# Patient Record
Sex: MALE
[De-identification: names, ages, dates, MRNs, and addresses within clinical notes are randomized per-mention and may not be internally consistent; named-entity substitution may affect disease eponyms.]

---

## 2017-08-21 ENCOUNTER — CHARTING TRANS (OUTPATIENT)
Dept: OTHER | Age: 47
End: 2017-08-21

## 2018-11-03 VITALS
BODY MASS INDEX: 27.8 KG/M2 | HEIGHT: 66 IN | WEIGHT: 173 LBS | HEART RATE: 96 BPM | OXYGEN SATURATION: 98 % | SYSTOLIC BLOOD PRESSURE: 134 MMHG | TEMPERATURE: 98.6 F | DIASTOLIC BLOOD PRESSURE: 74 MMHG | RESPIRATION RATE: 16 BRPM

## 2022-08-31 ENCOUNTER — APPOINTMENT (OUTPATIENT)
Dept: MRI IMAGING | Facility: HOSPITAL | Age: 52
End: 2022-08-31
Attending: EMERGENCY MEDICINE
Payer: COMMERCIAL

## 2022-08-31 ENCOUNTER — HOSPITAL ENCOUNTER (OUTPATIENT)
Facility: HOSPITAL | Age: 52
Setting detail: OBSERVATION
Discharge: HOME HEALTH | End: 2022-09-02
Attending: EMERGENCY MEDICINE | Admitting: HOSPITALIST
Payer: COMMERCIAL

## 2022-08-31 ENCOUNTER — HOSPITAL ENCOUNTER (INPATIENT)
Facility: HOSPITAL | Age: 52
LOS: 1 days | Discharge: HOME HEALTH | End: 2022-09-02
Attending: EMERGENCY MEDICINE | Admitting: HOSPITALIST
Payer: COMMERCIAL

## 2022-08-31 DIAGNOSIS — R42 DIZZINESS: Primary | ICD-10-CM

## 2022-08-31 DIAGNOSIS — I63.512 ARTERIAL ISCHEMIC STROKE, MCA (MIDDLE CEREBRAL ARTERY), LEFT, ACUTE (HCC): ICD-10-CM

## 2022-08-31 DIAGNOSIS — H53.2 DIPLOPIA: ICD-10-CM

## 2022-08-31 LAB
BASE EXCESS BLD CALC-SCNC: 8 MMOL/L (ref ?–2)
HCO3 BLDV-SCNC: 30 MEQ/L (ref 22–26)
PCO2 BLDV: 56 MM HG (ref 38–50)
PH BLDV: 7.4 [PH] (ref 7.32–7.43)
PO2 BLDV: 29 MM HG (ref 35–40)
PUNCTURE CHARGE: NO
SAO2 % BLDV: 50.6 % (ref 60–85)
SARS-COV-2 RNA RESP QL NAA+PROBE: NOT DETECTED

## 2022-08-31 PROCEDURE — 99220 INITIAL OBSERVATION CARE,LEVL III: CPT | Performed by: INTERNAL MEDICINE

## 2022-08-31 PROCEDURE — 70551 MRI BRAIN STEM W/O DYE: CPT | Performed by: EMERGENCY MEDICINE

## 2022-08-31 RX ORDER — ATORVASTATIN CALCIUM 40 MG/1
40 TABLET, FILM COATED ORAL NIGHTLY
Status: DISCONTINUED | OUTPATIENT
Start: 2022-09-01 | End: 2022-09-01

## 2022-08-31 RX ORDER — ACETAMINOPHEN 500 MG
500 TABLET ORAL EVERY 4 HOURS PRN
Status: DISCONTINUED | OUTPATIENT
Start: 2022-08-31 | End: 2022-09-02

## 2022-08-31 RX ORDER — MECLIZINE HYDROCHLORIDE 25 MG/1
25 TABLET ORAL ONCE
Status: COMPLETED | OUTPATIENT
Start: 2022-08-31 | End: 2022-08-31

## 2022-08-31 RX ORDER — ASPIRIN 81 MG/1
81 TABLET, CHEWABLE ORAL NIGHTLY
Status: DISCONTINUED | OUTPATIENT
Start: 2022-09-01 | End: 2022-09-01

## 2022-08-31 RX ORDER — MECLIZINE HYDROCHLORIDE 25 MG/1
25 TABLET ORAL EVERY 6 HOURS PRN
Status: DISCONTINUED | OUTPATIENT
Start: 2022-08-31 | End: 2022-09-02

## 2022-08-31 NOTE — ED QUICK NOTES
Patient comes from home with c/o dizziness and fatigue for past 3 days. 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 Patient denies any pain or SOB. Patient is A&Ox4. Mother at bedside.

## 2022-09-01 ENCOUNTER — APPOINTMENT (OUTPATIENT)
Dept: CT IMAGING | Facility: HOSPITAL | Age: 52
End: 2022-09-01
Attending: Other
Payer: COMMERCIAL

## 2022-09-01 LAB
ALBUMIN SERPL-MCNC: 3.5 G/DL (ref 3.4–5)
ALBUMIN/GLOB SERPL: 1 {RATIO} (ref 1–2)
ALP LIVER SERPL-CCNC: 69 U/L
ALT SERPL-CCNC: 26 U/L
ANION GAP SERPL CALC-SCNC: 3 MMOL/L (ref 0–18)
AST SERPL-CCNC: 18 U/L (ref 15–37)
BASE EXCESS BLD CALC-SCNC: 6 MMOL/L (ref ?–2)
BASOPHILS # BLD AUTO: 0.06 X10(3) UL (ref 0–0.2)
BASOPHILS NFR BLD AUTO: 1 %
BILIRUB SERPL-MCNC: 0.7 MG/DL (ref 0.1–2)
BILIRUB UR QL: NEGATIVE
BUN BLD-MCNC: 17 MG/DL (ref 7–18)
BUN/CREAT SERPL: 18.5 (ref 10–20)
CALCIUM BLD-MCNC: 9 MG/DL (ref 8.5–10.1)
CHLORIDE SERPL-SCNC: 106 MMOL/L (ref 98–112)
CHOLEST SERPL-MCNC: 135 MG/DL (ref ?–200)
CLARITY UR: CLEAR
CO2 SERPL-SCNC: 32 MMOL/L (ref 21–32)
COLOR UR: YELLOW
CREAT BLD-MCNC: 0.92 MG/DL
DEPRECATED RDW RBC AUTO: 39.9 FL (ref 35.1–46.3)
EOSINOPHIL # BLD AUTO: 0.31 X10(3) UL (ref 0–0.7)
EOSINOPHIL NFR BLD AUTO: 5.3 %
ERYTHROCYTE [DISTWIDTH] IN BLOOD BY AUTOMATED COUNT: 12.6 % (ref 11–15)
EST. AVERAGE GLUCOSE BLD GHB EST-MCNC: 117 MG/DL (ref 68–126)
GFR SERPLBLD BASED ON 1.73 SQ M-ARVRAT: 101 ML/MIN/1.73M2 (ref 60–?)
GLOBULIN PLAS-MCNC: 3.4 G/DL (ref 2.8–4.4)
GLUCOSE BLD-MCNC: 93 MG/DL (ref 70–99)
GLUCOSE UR-MCNC: NEGATIVE MG/DL
HBA1C MFR BLD: 5.7 % (ref ?–5.7)
HCO3 BLDA-SCNC: 29.5 MEQ/L (ref 21–27)
HCT VFR BLD AUTO: 42.3 %
HDLC SERPL-MCNC: 35 MG/DL (ref 40–59)
HGB BLD-MCNC: 13.7 G/DL
HGB UR QL STRIP.AUTO: NEGATIVE
IMM GRANULOCYTES # BLD AUTO: 0.02 X10(3) UL (ref 0–1)
IMM GRANULOCYTES NFR BLD: 0.3 %
KETONES UR-MCNC: NEGATIVE MG/DL
LDLC SERPL CALC-MCNC: 75 MG/DL (ref ?–100)
LEUKOCYTE ESTERASE UR QL STRIP.AUTO: NEGATIVE
LYMPHOCYTES # BLD AUTO: 1.67 X10(3) UL (ref 1–4)
LYMPHOCYTES NFR BLD AUTO: 28.7 %
MCH RBC QN AUTO: 28.2 PG (ref 26–34)
MCHC RBC AUTO-ENTMCNC: 32.4 G/DL (ref 31–37)
MCV RBC AUTO: 87 FL
MODIFIED ALLEN TEST: POSITIVE
MONOCYTES # BLD AUTO: 0.48 X10(3) UL (ref 0.1–1)
MONOCYTES NFR BLD AUTO: 8.3 %
NEUTROPHILS # BLD AUTO: 3.27 X10 (3) UL (ref 1.5–7.7)
NEUTROPHILS # BLD AUTO: 3.27 X10(3) UL (ref 1.5–7.7)
NEUTROPHILS NFR BLD AUTO: 56.4 %
NITRITE UR QL STRIP.AUTO: NEGATIVE
NONHDLC SERPL-MCNC: 100 MG/DL (ref ?–130)
O2 CT BLD-SCNC: 18.9 VOL% (ref 15–23)
OSMOLALITY SERPL CALC.SUM OF ELEC: 293 MOSM/KG (ref 275–295)
PCO2 BLDA: 43 MM HG (ref 35–45)
PH BLDA: 7.46 [PH] (ref 7.35–7.45)
PH UR: 7 [PH] (ref 5–8)
PLATELET # BLD AUTO: 190 10(3)UL (ref 150–450)
PO2 BLDA: 71 MM HG (ref 80–100)
POTASSIUM SERPL-SCNC: 3.6 MMOL/L (ref 3.5–5.1)
PROT SERPL-MCNC: 6.9 G/DL (ref 6.4–8.2)
PROT UR-MCNC: NEGATIVE MG/DL
PUNCTURE CHARGE: YES
RBC # BLD AUTO: 4.86 X10(6)UL
SAO2 % BLDA: 96.3 % (ref 94–100)
SODIUM SERPL-SCNC: 141 MMOL/L (ref 136–145)
SP GR UR STRIP: 1.02 (ref 1–1.03)
TRIGL SERPL-MCNC: 142 MG/DL (ref 30–149)
TSI SER-ACNC: 2.18 MIU/ML (ref 0.36–3.74)
UROBILINOGEN UR STRIP-ACNC: 0.2
VIT B12 SERPL-MCNC: 615 PG/ML (ref 193–986)
VLDLC SERPL CALC-MCNC: 22 MG/DL (ref 0–30)
WBC # BLD AUTO: 5.8 X10(3) UL (ref 4–11)

## 2022-09-01 PROCEDURE — 99223 1ST HOSP IP/OBS HIGH 75: CPT | Performed by: OTHER

## 2022-09-01 PROCEDURE — 70450 CT HEAD/BRAIN W/O DYE: CPT | Performed by: OTHER

## 2022-09-01 PROCEDURE — 99233 SBSQ HOSP IP/OBS HIGH 50: CPT | Performed by: HOSPITALIST

## 2022-09-01 RX ORDER — ASPIRIN 325 MG
325 TABLET ORAL DAILY
Status: DISCONTINUED | OUTPATIENT
Start: 2022-09-01 | End: 2022-09-02

## 2022-09-01 RX ORDER — LABETALOL HYDROCHLORIDE 5 MG/ML
10 INJECTION, SOLUTION INTRAVENOUS EVERY 10 MIN PRN
Status: DISCONTINUED | OUTPATIENT
Start: 2022-09-01 | End: 2022-09-02

## 2022-09-01 RX ORDER — ONDANSETRON 2 MG/ML
4 INJECTION INTRAMUSCULAR; INTRAVENOUS EVERY 6 HOURS PRN
Status: DISCONTINUED | OUTPATIENT
Start: 2022-09-01 | End: 2022-09-02

## 2022-09-01 RX ORDER — ASPIRIN 300 MG/1
300 SUPPOSITORY RECTAL DAILY
Status: DISCONTINUED | OUTPATIENT
Start: 2022-09-01 | End: 2022-09-02

## 2022-09-01 RX ORDER — HYDRALAZINE HYDROCHLORIDE 20 MG/ML
10 INJECTION INTRAMUSCULAR; INTRAVENOUS EVERY 2 HOUR PRN
Status: DISCONTINUED | OUTPATIENT
Start: 2022-09-01 | End: 2022-09-02

## 2022-09-01 RX ORDER — PROCHLORPERAZINE EDISYLATE 5 MG/ML
5 INJECTION INTRAMUSCULAR; INTRAVENOUS EVERY 8 HOURS PRN
Status: DISCONTINUED | OUTPATIENT
Start: 2022-09-01 | End: 2022-09-02

## 2022-09-01 RX ORDER — ACETAMINOPHEN 650 MG/1
650 SUPPOSITORY RECTAL EVERY 4 HOURS PRN
Status: DISCONTINUED | OUTPATIENT
Start: 2022-09-01 | End: 2022-09-02

## 2022-09-01 RX ORDER — ENOXAPARIN SODIUM 100 MG/ML
40 INJECTION SUBCUTANEOUS DAILY
Status: DISCONTINUED | OUTPATIENT
Start: 2022-09-02 | End: 2022-09-02

## 2022-09-01 RX ORDER — SODIUM CHLORIDE 9 MG/ML
INJECTION, SOLUTION INTRAVENOUS CONTINUOUS
Status: DISCONTINUED | OUTPATIENT
Start: 2022-09-01 | End: 2022-09-02

## 2022-09-01 RX ORDER — ATORVASTATIN CALCIUM 80 MG/1
80 TABLET, FILM COATED ORAL NIGHTLY
Status: DISCONTINUED | OUTPATIENT
Start: 2022-09-01 | End: 2022-09-02

## 2022-09-01 RX ORDER — ACETAMINOPHEN 325 MG/1
650 TABLET ORAL EVERY 4 HOURS PRN
Status: DISCONTINUED | OUTPATIENT
Start: 2022-09-01 | End: 2022-09-02

## 2022-09-01 NOTE — ED QUICK NOTES
Orders for admission, patient is aware of plan and ready to go upstairs. Any questions, please call ED RN Joya Cooks at extension [phone].      Patient Covid vaccination status: Fully vaccinated     COVID Test Ordered in ED: Rapid SARS-CoV-2 by PCR    COVID Suspicion at Admission: Low clinical suspicion for COVID    Running Infusions:  None    Mental Status/LOC at time of transport: A&O*4    Other pertinent information:   CIWA score: N/A   NIH score:  N/A

## 2022-09-01 NOTE — PLAN OF CARE
VSS, no pain. Head CT done today. Mother bedside. Patient ambulated in [place] today, some dizziness still. Problem: Patient Centered Care  Goal: Patient preferences are identified and integrated in the patient's plan of care  Description: Interventions:  - What would you like us to know as we care for you? Home alone  - Provide timely, complete, and accurate information to patient/family  - Incorporate patient and family knowledge, values, beliefs, and cultural backgrounds into the planning and delivery of care  - Encourage patient/family to participate in care and decision-making at the level they choose  - Honor patient and family perspectives and choices  Outcome: Progressing     Problem: Patient/Family Goals  Goal: Patient/Family Long Term Goal  Description: Patient's Long Term Goal:     Interventions:  -   - See additional Care Plan goals for specific interventions  Outcome: Progressing  Goal: Patient/Family Short Term Goal  Description: Patient's Short Term Goal:     Interventions:   -   - See additional Care Plan goals for specific interventions  Outcome: Progressing     Problem: SKIN/TISSUE INTEGRITY - ADULT  Goal: Skin integrity remains intact  Description: INTERVENTIONS  - Assess and document risk factors for pressure ulcer development  - Assess and document skin integrity  - Monitor for areas of redness and/or skin breakdown  - Initiate interventions, skin care algorithm/standards of care as needed  Outcome: Progressing     Problem: NEUROLOGICAL - ADULT  Goal: Achieves stable or improved neurological status  Description: INTERVENTIONS  - Assess for and report changes in neurological status  - Initiate measures to prevent increased intracranial pressure  - Maintain blood pressure and fluid volume within ordered parameters to optimize cerebral perfusion and minimize risk of hemorrhage  - Monitor temperature, glucose, and sodium.  Initiate appropriate interventions as ordered  Outcome: Progressing Problem: Impaired Functional Mobility  Goal: Achieve highest/safest level of mobility/gait  Description: Interventions:  - Assess patient's functional ability and stability  - Promote increasing activity/tolerance for mobility and gait  - Educate and engage patient/family in tolerated activity level and precautions    Outcome: Progressing     Problem: SAFETY ADULT - FALL  Goal: Free from fall injury  Description: INTERVENTIONS:  - Assess pt frequently for physical needs  - Identify cognitive and physical deficits and behaviors that affect risk of falls.   - [place] fall precautions as indicated by assessment.  - Educate pt/family on patient safety including physical limitations  - Instruct pt to call for assistance with activity based on assessment  - Modify environment to reduce risk of injury  - Provide assistive devices as appropriate  - Consider OT/PT consult to assist with strengthening/mobility  - Encourage toileting schedule  Outcome: Progressing

## 2022-09-01 NOTE — PLAN OF CARE
Problem: Patient Centered Care  Goal: Patient preferences are identified and integrated in the patient's plan of care  Description: Interventions:  - What would you like us to know as we care for you? \" I live by myself\". - Provide timely, complete, and accurate information to patient/family  - Incorporate patient and family knowledge, values, beliefs, and cultural backgrounds into the planning and delivery of care  - Encourage patient/family to participate in care and decision-making at the level they choose  - Honor patient and family perspectives and choices  Outcome: Progressing     Problem: Patient/Family Goals  Goal: Patient/Family Long Term Goal  Description: Patient's Long Term Goal: Go home    Interventions:  -Discharge planning  -Follow MD orders  -Monitor vitals, labs  -Assist w/ adl's  - See additional Care Plan goals for specific interventions  Outcome: Progressing  Goal: Patient/Family Short Term Goal  Description: Patient's Short Term Goal:  Go home  Interventions:   -Discharge planning  -Follow MD orders  -Monitor vitals, labs  -Assist w/ adl's    - See additional Care Plan goals for specific interventions  Outcome: Progressing     Problem: SKIN/TISSUE INTEGRITY - ADULT  Goal: Skin integrity remains intact  Description: INTERVENTIONS  - Assess and document risk factors for pressure ulcer development  - Assess and document skin integrity  - Monitor for areas of redness and/or skin breakdown  - Initiate interventions, skin care algorithm/standards of care as needed  Outcome: Progressing     Problem: NEUROLOGICAL - ADULT  Goal: Achieves stable or improved neurological status  Description: INTERVENTIONS  - Assess for and report changes in neurological status  - Initiate measures to prevent increased intracranial pressure  - Maintain blood pressure and fluid volume within ordered parameters to optimize cerebral perfusion and minimize risk of hemorrhage  - Monitor temperature, glucose, and sodium. Initiate appropriate interventions as ordered  Outcome: Progressing     Problem: Impaired Functional Mobility  Goal: Achieve highest/safest level of mobility/gait  Description: Interventions:  - Assess patient's functional ability and stability  - Promote increasing activity/tolerance for mobility and gait  - Educate and engage patient/family in tolerated activity level and precautions  Outcome: Progressing     Problem: SAFETY ADULT - FALL  Goal: Free from fall injury  Description: INTERVENTIONS:  - Assess pt frequently for physical needs  - Identify cognitive and physical deficits and behaviors that affect risk of falls. - [place] fall precautions as indicated by assessment.  - Educate pt/family on patient safety including physical limitations  - Instruct pt to call for assistance with activity based on assessment  - Modify environment to reduce risk of injury  - Provide assistive devices as appropriate  - Consider OT/PT consult to assist with strengthening/mobility  - Encourage toileting schedule  Outcome: Progressing   No acute changes, sleeping most of the night.

## 2022-09-01 NOTE — CM/SW NOTE
CM met w/pt and mother at bedside. Home w/mother, IADLs PTA. No hx w/HH   Hx stroke & brain tumor resection    Presents for unsteady gait. Pt verified address and stated he is employed. Pt does not use any assistive devices. PLAN; TBD    CM/SW to remain available for support and/or discharge planning.     Shoaib Roman RN, [facility]    Ext.  [phone]

## 2022-09-01 NOTE — PROGRESS NOTES
Neurology came by to assess patient. Believes patient had new stroke prior to admission. Stroke order-set ordered per neuro. RN notified TL and transfer order requested to transfer patient to CV floor. Neurology ok with transfer.

## 2022-09-02 ENCOUNTER — APPOINTMENT (OUTPATIENT)
Dept: CV DIAGNOSTICS | Facility: HOSPITAL | Age: 52
End: 2022-09-02
Attending: Other
Payer: COMMERCIAL

## 2022-09-02 ENCOUNTER — APPOINTMENT (OUTPATIENT)
Dept: GENERAL RADIOLOGY | Facility: HOSPITAL | Age: 52
End: 2022-09-02
Attending: Other
Payer: COMMERCIAL

## 2022-09-02 ENCOUNTER — APPOINTMENT (OUTPATIENT)
Dept: CT IMAGING | Facility: HOSPITAL | Age: 52
End: 2022-09-02
Attending: Other
Payer: COMMERCIAL

## 2022-09-02 VITALS
RESPIRATION RATE: 18 BRPM | OXYGEN SATURATION: 95 % | DIASTOLIC BLOOD PRESSURE: 82 MMHG | SYSTOLIC BLOOD PRESSURE: 148 MMHG | TEMPERATURE: 99 F | HEART RATE: 93 BPM | WEIGHT: 184.81 LBS | BODY MASS INDEX: 29.7 KG/M2 | HEIGHT: 66 IN

## 2022-09-02 LAB
GLUCOSE BLDC GLUCOMTR-MCNC: 108 MG/DL (ref 70–99)
GLUCOSE BLDC GLUCOMTR-MCNC: 123 MG/DL (ref 70–99)
GLUCOSE BLDC GLUCOMTR-MCNC: 82 MG/DL (ref 70–99)

## 2022-09-02 PROCEDURE — 93306 TTE W/DOPPLER COMPLETE: CPT | Performed by: OTHER

## 2022-09-02 PROCEDURE — 99221 1ST HOSP IP/OBS SF/LOW 40: CPT | Performed by: NEUROLOGICAL SURGERY

## 2022-09-02 PROCEDURE — 70250 X-RAY EXAM OF SKULL: CPT | Performed by: OTHER

## 2022-09-02 PROCEDURE — 74018 RADEX ABDOMEN 1 VIEW: CPT | Performed by: OTHER

## 2022-09-02 PROCEDURE — 71045 X-RAY EXAM CHEST 1 VIEW: CPT | Performed by: OTHER

## 2022-09-02 PROCEDURE — 70496 CT ANGIOGRAPHY HEAD: CPT | Performed by: OTHER

## 2022-09-02 PROCEDURE — 99233 SBSQ HOSP IP/OBS HIGH 50: CPT | Performed by: OTHER

## 2022-09-02 PROCEDURE — 70498 CT ANGIOGRAPHY NECK: CPT | Performed by: OTHER

## 2022-09-02 PROCEDURE — 70360 X-RAY EXAM OF NECK: CPT | Performed by: OTHER

## 2022-09-02 RX ORDER — ATORVASTATIN CALCIUM 80 MG/1
80 TABLET, FILM COATED ORAL NIGHTLY
Qty: 90 TABLET | Refills: 3 | Status: SHIPPED | OUTPATIENT
Start: 2022-09-02 | End: 2023-09-02

## 2022-09-02 NOTE — PLAN OF CARE
Patient alert and oriented. Neuros done Q4 and daily NIH. No complaints of visual problems. Cleared for discharge home with mother and home health. IV removed and tele discontinued. Instructions reviewed with patient and mother, questions answered and verbalized understanding. Transported home by mother. Problem: Patient Centered Care  Goal: Patient preferences are identified and integrated in the patient's plan of care  Description: Interventions:  - What would you like us to know as we care for you?  I have a shunt  - Provide timely, complete, and accurate information to patient/family  - Incorporate patient and family knowledge, values, beliefs, and cultural backgrounds into the planning and delivery of care  - Encourage patient/family to participate in care and decision-making at the level they choose  - Honor patient and family perspectives and choices  9/2/2022 1757 by Corry Barry RN  Outcome: Completed  9/2/2022 1057 by Corry Barry RN  Outcome: Progressing     Problem: Patient/Family Goals  Goal: Patient/Family Long Term Goal  Description: Patient's Long Term Goal: return home    Interventions:  - neuro checks  - physical therapy  - monitor labs and vitals  - MRI and CT  - See additional Care Plan goals for specific interventions  9/2/2022 1757 by Corry Barry RN  Outcome: Completed  9/2/2022 1057 by Corry Barry RN  Outcome: Progressing  Goal: Patient/Family Short Term Goal  Description: Patient's Short Term Goal: improve vision    Interventions:   - monitor labs and vitals  - MRI and CT  - neuro checks  - See additional Care Plan goals for specific interventions  9/2/2022 1757 by Corry Barry RN  Outcome: Completed  9/2/2022 1057 by Corry Barry RN  Outcome: Progressing     Problem: SKIN/TISSUE INTEGRITY - ADULT  Goal: Skin integrity remains intact  Description: INTERVENTIONS  - Assess and document risk factors for pressure ulcer development  - Assess and document skin integrity  - Monitor for areas of redness and/or skin breakdown  - Initiate interventions, skin care algorithm/standards of care as needed  9/2/2022 1757 by Jennie Pompa RN  Outcome: Completed  9/2/2022 1057 by Jennie Pompa RN  Outcome: Progressing     Problem: NEUROLOGICAL - ADULT  Goal: Achieves stable or improved neurological status  Description: INTERVENTIONS  - Assess for and report changes in neurological status  - Initiate measures to prevent increased intracranial pressure  - Maintain blood pressure and fluid volume within ordered parameters to optimize cerebral perfusion and minimize risk of hemorrhage  - Monitor temperature, glucose, and sodium.  Initiate appropriate interventions as ordered  9/2/2022 1757 by Jennie Pompa RN  Outcome: Completed  9/2/2022 1057 by Jennie Pompa RN  Outcome: Progressing  Goal: Achieves maximal functionality and self care  Description: INTERVENTIONS  - Monitor swallowing and airway patency with patient fatigue and changes in neurological status  - Encourage and assist patient to increase activity and self care with guidance from PT/OT  - Encourage visually impaired, hearing impaired and aphasic patients to use assistive/communication devices  9/2/2022 1757 by Jennie Pompa RN  Outcome: Completed  9/2/2022 1057 by Jennie Pompa RN  Outcome: Progressing     Problem: Impaired Functional Mobility  Goal: Achieve highest/safest level of mobility/gait  Description: Interventions:  - Assess patient's functional ability and stability  - Promote increasing activity/tolerance for mobility and gait  - Educate and engage patient/family in tolerated activity level and precautions  - Recommend use of chair position in bed 3 times per day  9/2/2022 1757 by Jennie Pompa RN  Outcome: Completed  9/2/2022 1057 by Jennie Pompa RN  Outcome: Progressing     Problem: SAFETY ADULT - FALL  Goal: Free from fall injury  Description: INTERVENTIONS:  - Assess pt frequently for physical needs  - Identify cognitive and physical deficits and behaviors that affect risk of falls.   - [place] fall precautions as indicated by assessment.  - Educate pt/family on patient safety including physical limitations  - Instruct pt to call for assistance with activity based on assessment  - Modify environment to reduce risk of injury  - Provide assistive devices as appropriate  - Consider OT/PT consult to assist with strengthening/mobility  - Encourage toileting schedule  9/2/2022 1757 by Neha Lai, RN  Outcome: Completed  9/2/2022 1057 by Neha Lai, RN  Outcome: Progressing

## 2022-09-02 NOTE — PLAN OF CARE
Patient alert x4, RA. No acute events or neuro changes throughout the night. Educated patient to call for assistance, call light within reach. Problem: Patient Centered Care  Goal: Patient preferences are identified and integrated in the patient's plan of care  Description: Interventions:  - What would you like us to know as we care for you?   - Provide timely, complete, and accurate information to patient/family  - Incorporate patient and family knowledge, values, beliefs, and cultural backgrounds into the planning and delivery of care  - Encourage patient/family to participate in care and decision-making at the level they choose  - Honor patient and family perspectives and choices  Outcome: Progressing     Problem: SKIN/TISSUE INTEGRITY - ADULT  Goal: Skin integrity remains intact  Description: INTERVENTIONS  - Assess and document risk factors for pressure ulcer development  - Assess and document skin integrity  - Monitor for areas of redness and/or skin breakdown  - Initiate interventions, skin care algorithm/standards of care as needed  Outcome: Progressing     Problem: NEUROLOGICAL - ADULT  Goal: Achieves stable or improved neurological status  Description: INTERVENTIONS  - Assess for and report changes in neurological status  - Initiate measures to prevent increased intracranial pressure  - Maintain blood pressure and fluid volume within ordered parameters to optimize cerebral perfusion and minimize risk of hemorrhage  - Monitor temperature, glucose, and sodium.  Initiate appropriate interventions as ordered  Outcome: Progressing  Goal: Achieves maximal functionality and self care  Description: INTERVENTIONS  - Monitor swallowing and airway patency with patient fatigue and changes in neurological status  - Encourage and assist patient to increase activity and self care with guidance from PT/OT  - Encourage visually impaired, hearing impaired and aphasic patients to use assistive/communication devices  Outcome: Progressing     Problem: Impaired Functional Mobility  Goal: Achieve highest/safest level of mobility/gait  Description: Interventions:  - Assess patient's functional ability and stability  - Promote increasing activity/tolerance for mobility and gait  - Educate and engage patient/family in tolerated activity level and precautions    Outcome: Progressing     Problem: SAFETY ADULT - FALL  Goal: Free from fall injury  Description: INTERVENTIONS:  - Assess pt frequently for physical needs  - Identify cognitive and physical deficits and behaviors that affect risk of falls.   - [place] fall precautions as indicated by assessment.  - Educate pt/family on patient safety including physical limitations  - Instruct pt to call for assistance with activity based on assessment  - Modify environment to reduce risk of injury  - Provide assistive devices as appropriate  - Consider OT/PT consult to assist with strengthening/mobility  - Encourage toileting schedule  Outcome: Progressing

## 2022-09-02 NOTE — CONSULTS
Asked to check shunt after mri on 8/31    Shunt checked, set at 2.0  spoke with patient and family- they don't know setting  Last note from care everywhere from [facility] neurosurgery states it is supposed to be set at 1.5    Valve reset to 1.5  Checked again    No further dilia follow up needed  F/u with surgeon at [facility]    Call with any ?'s/concerns

## 2022-09-02 NOTE — CM/SW NOTE
Pt is from home alone but will be staying with his mother who lives at [address]. [id number]. PT recommending HH w/PT if there is 24 hr supervision at dc, pt and mother agreeable to dc recommendation. [facility] referral sent w/correct dc address, F2F completed. List of accepting agencies will be needed for choice. 1330: Pt and mother provided list of accepting [facility] agencies at bedside. Pt agreeable to review and provide choice once able. Plan: Home w/mother and [facility] pending agency choice and medical clearance.     Reba Powers, BRISEYDAN    [phone]

## 2022-09-02 NOTE — CM/SW NOTE
09/02/22 1600   Discharge disposition   Expected discharge disposition Home-Health   Post Acute Care Provider   ([facility])   Discharge transportation Private car     Pt discussed during nursing rounds. Pt is stable for dc today. MD dc order entered. [facility] will provide RN and PT services at [place], agency notified of patient's dc home today. Pt's mother will provide transport at [place]. Plan: Home w/spouse and [facility] for [facility] services today. / to remain available for support and/or discharge planning.      BRISEYDA CruzN    [phone]

## 2022-09-02 NOTE — SLP NOTE
SPEECH DAILY NOTE - INPATIENT    ASSESSMENT & PLAN   ASSESSMENT    Proper PPE worn. Hands sanitized upon entrance/exit Pt room. Pt alert, afebrile and on room air. Pt seen for swallow analysis per BSE recommendations (after consulting with RN). Pt seen upright in bed with current diet of solid/thin liquids (BSE allowed straw use) for monitoring diet tolerance. Swallowing precautions/strategies discussed; mild cues needed for execution. Functional bite reflex/mastication/lingual skill on solids. No significant oral retention. Pharyngeal response appeared to trigger within 1-2 sec per hyolaryngeal elevation to completion (functional rise/strength per palpation). No overt clinical signs of aspiration on swallows of solid nor thin liquid via open cup (no coughing, no throat clearing, clear vocal quality and no SOB). Pt with aggressive coughing with thin liquids with straw use. Straws now prohibited. Collaborated with RN regarding Pt's swallowing plan of care. No report of difficulty taking meds. No CXR has been completed. Sp02 ~97% during this session. PLAN:    To f/u x1 session/meal to ensure safe tolerance of diet and reinforce swallowing/aspiration precautions. Will monitor for any CXR results. Family education as available. Diet Recommendations - Solids: Regular  Diet Recommendations - Liquids: Thin Liquids    Compensatory Strategies Recommended: No straws  Aspiration Precautions: Upright position; Slow rate;Small bites and sips  Medication Administration Recommendations: One pill at a time    Patient Experiencing Pain: No  Discharge Recommendations  Discharge Recommendations/Plan: Undetermined  Treatment Plan  Treatment Plan/Recommendations: Dysphagia therapy; Aspiration precautions   Interdisciplinary Communication: Discussed with RN    GOALS  Goal #1 The patient will tolerate Regular solid consistency and thin liquids without overt signs or symptoms of aspiration with 100 % accuracy over 1-2 session(s). No CSA on trials of solid nor thin liquids via open controlled cup for 100% of trials. Pt with aggressive coughing with thin liquids via straw. Now NO STRAW precaution added. .  In Progress   Goal #2 The patient will utilize compensatory strategies as outlined by  BSSE (clinical evaluation) including Slow rate, Small bites, Small sips, NO STRAW, Upright 90 degrees with min assistance 90 % of the time across 1-2 sessions. Pt upright in bed, self-fed oral trials. Swallowing precautions discussed/demonstrated. Mild cues for  controlled amounts. Now, NO STRAW added to swallowing precautions. Pt would benefit from additional reinforcement of aspiration precautions. Swallowing strategies written on white board in room.   In Progress   FOLLOW UP  Follow Up Needed (Documentation Required): Yes  SLP Follow-up Date: 09/03/22  Number of Visits to Meet Established Goals: 2    Session: 1    If you have any questions, please contact   IRIS Vale [facility]  #[phone]

## 2022-09-06 ENCOUNTER — TELEPHONE (OUTPATIENT)
Dept: NEUROLOGY | Facility: CLINIC | Age: 52
End: 2022-09-06

## 2022-09-06 NOTE — TELEPHONE ENCOUNTER
Dr. Jazmine Hernandez had recommended a DrHugo at [facility] but patient mother in unable to make an apt until December . Patient mother will  like to speak  to dr. Jazmine Hernandez to see if he can call them for a sooner apt .   Please advise

## 2022-09-07 DIAGNOSIS — I63.512 ARTERIAL ISCHEMIC STROKE, MCA (MIDDLE CEREBRAL ARTERY), LEFT, ACUTE (HCC): Primary | ICD-10-CM

## 2022-09-07 LAB — VITAMIN B1 (THIAMINE), WHOLE B: 126 NMOL/L

## 2022-09-07 NOTE — TELEPHONE ENCOUNTER
I can order outpatient physical therapy. I cannot get him an earlier appointment with Dr. Tomer Nguyen. I still think that he should see Dr. Tomer Nguyen for an opinion about his stroke.

## 2022-09-08 NOTE — TELEPHONE ENCOUNTER
Spoke to patient mother to notify of PT order. Scheduling phone number provided a time of call. She will call & schedule.     Patient mother was able to get an earlier appointment with Dr Thu Herzog for September 21st.

## 2022-09-09 ENCOUNTER — TELEPHONE (OUTPATIENT)
Dept: NEUROLOGY | Facility: CLINIC | Age: 52
End: 2022-09-09

## 2022-09-09 NOTE — TELEPHONE ENCOUNTER
Received [facility] paperwork for the patient. The patient was evaluated in the ER on 9/2/22 by VS.  The patient has a pending appointment on 9/23/22. Dr. Fuad Mitchell, would you like for us to complete the [facility] paperwork based on the ER progress notes, or can a letter be written excusing the patient from work until he is seen in the office on 9/23/22? Please advise. Paperwork is currently in the nurse bin. Reviewed and electronically signed by:  [address], [facility]

## 2022-09-09 NOTE — TELEPHONE ENCOUNTER
I will send him a [email or web address] message with a work excuse letter until 9/23/2022. We can do the [facility] paperwork afterwards.

## 2022-09-12 ENCOUNTER — TELEPHONE (OUTPATIENT)
Dept: PHYSICAL THERAPY | Facility: HOSPITAL | Age: 52
End: 2022-09-12

## 2022-09-13 ENCOUNTER — OFFICE VISIT (OUTPATIENT)
Dept: PHYSICAL THERAPY | Facility: HOSPITAL | Age: 52
End: 2022-09-13
Attending: Other
Payer: COMMERCIAL

## 2022-09-13 DIAGNOSIS — I63.512 ARTERIAL ISCHEMIC STROKE, MCA (MIDDLE CEREBRAL ARTERY), LEFT, ACUTE (HCC): ICD-10-CM

## 2022-09-13 PROCEDURE — 97161 PT EVAL LOW COMPLEX 20 MIN: CPT

## 2022-09-19 ENCOUNTER — OFFICE VISIT (OUTPATIENT)
Dept: PHYSICAL THERAPY | Facility: HOSPITAL | Age: 52
End: 2022-09-19
Attending: Other
Payer: COMMERCIAL

## 2022-09-19 PROCEDURE — 97110 THERAPEUTIC EXERCISES: CPT

## 2022-09-19 PROCEDURE — 97112 NEUROMUSCULAR REEDUCATION: CPT

## 2022-09-19 NOTE — PATIENT INSTRUCTIONS
Do these exercises 1-2 times each day. 1. Stand tall at the countertop and hold on lightly with one hand. Lift one leg forward, out to the side and backward, keeping leg straight, and balancing. Not too big of a lift. Do 10 times in each direction, then repeat on the other leg. 2. Stand next to bed or countertop for safety. Hold a thumbs up at arms length in front of your eyes. Keep eyes focused on target. Turn eyes, head and target together left and right. Do 10 turns, then repeat in up and down direction. 3. Walk and turn your head side to side to look at the walls as if window shopping. Turn head every 4-5 steps and try to maintain a straight path. Do length of walkway in home, twice. Repeat with up and down head turns. 4. Stand with your bed behind you for safety (do not lean against bed). Stand tall and place feet next to each other so that they are touching. Cross arms, close your eyes. Balance for 30 seconds, paying attention to the feeling of your feet on the floor. 5. Stand next to bed or countertop for safety. Take a step forward with one foot and cross your arms. Balance with eyes open for 30 seconds. Practice with each foot in front. 6. Stand up tall facing one wall. Side step 15 times to the right then 15 times to the left. Face the same direction the whole time.

## 2022-09-19 NOTE — PROGRESS NOTES
Diagnosis: imbalance, CVA  Visit (# authorized):  10 per POC Sharashawn Keyon)                         Referring Physician: Aurelio Larsen    Precautions: at risk of falls, [place]    Medication changes since last visit?:  Yes: statin dose lowered by half    Subjective: No new information to present     Objective:    Date  9/19          Visit Number  2          NMR x          Ther EX x          Ther Act           Gait Training           CRM            Manual             Additional Treatment Information:    Therapeutic Exercise (18 mins):  Nu-step level 4x10 mins   3 kicks 1 UE support x10 B -demo with return demo, VC's for control   Side stepping x15 B     NMR (20 mins):   VOR cxl x10 ea horizontal and vertical-demo with return demo   Ambulation with head turns horizontal and vertical 50 ft x2 ea   Romberg EC 30 seconds (postural sway WNL)  Step stance EO 30 seconds ea foot in front   4 square step test: 16.8 seconds- close supervision      At risk of falls: YES   4 square stepping x3 ea CW and CCW -close supervision  Foam romberg EO 30 seconds (increased postural sway)   Pt selected SAM on foam, SBA   Upper trunk rotation x10 B   EC x30 sec (increased postural sway)      Patient Education:  HEP initiated and issued- see pt instructions     Assessment:   Pt mildly impulsive in movement but responds well to VC's for safety. Demo's understanding of material taught today. Is considered an increased risk of falls based on 4 square step test.     Plan: in future sessions: MMT LE, tap ups rebounder, backwards walking, speed ladder.      Charges: 2 NMR, 1 TE       Total Timed Treatment: 38 min  Total Treatment Time: 39 min

## 2022-09-22 ENCOUNTER — OFFICE VISIT (OUTPATIENT)
Dept: PHYSICAL THERAPY | Facility: HOSPITAL | Age: 52
End: 2022-09-22
Attending: Other

## 2022-09-22 PROCEDURE — 97112 NEUROMUSCULAR REEDUCATION: CPT

## 2022-09-22 NOTE — PROGRESS NOTES
Diagnosis: imbalance, CVA  Visit (# authorized):  10 per POC Christiane Benavides)                         Referring Physician: Sabrina Little    Precautions: at risk of falls, [place]    Medication changes since last visit?:  No    Subjective: No new information to present     Objective:    Date  9/19 9/22         Visit Number  2 3         NMR x x         Ther EX x          Ther Act           Gait Training           CRM            Manual             Additional Treatment Information:    NMR (39 mins):   Ambulation indoor, busier environment engaged in conversation, visually scanning -15 mins   Rebounder 4#   Pt selected SAM x15   Romberg x15- SBA    Fwd step throw<>catch x15 B SBA with CGA for balance checks   Standing unsupported: 4#    PNF lifts x10 B -demo with return demo   B shoulder flex OH x15 -head and eyes follow ball   Speed ladder    2 feet per box-multiple bouts - with and without lateral wedge L   1 foot per box 4 bouts -lateral wedge L     Patient Education:  Encouraged continued performance of HEP. Pt educated on rationale for lateral wedge L. Assessment:   Pt with supination of L foot at initial contact through midstance resulting in postural instability. This was greatly improved with addition of lateral wedge. Postural stability with reduction in path deviation noted with increased distance/time walking.      Plan: in future sessions: MMT LE, tap ups, backwards walking    Charges: 3 NMR       Total Timed Treatment: 39 min  Total Treatment Time: 39 min

## 2022-09-23 ENCOUNTER — OFFICE VISIT (OUTPATIENT)
Dept: NEUROLOGY | Facility: CLINIC | Age: 52
End: 2022-09-23

## 2022-09-23 VITALS
BODY MASS INDEX: 29.57 KG/M2 | HEIGHT: 66 IN | WEIGHT: 184 LBS | DIASTOLIC BLOOD PRESSURE: 68 MMHG | SYSTOLIC BLOOD PRESSURE: 122 MMHG | HEART RATE: 92 BPM

## 2022-09-23 DIAGNOSIS — I63.512 ARTERIAL ISCHEMIC STROKE, MCA (MIDDLE CEREBRAL ARTERY), LEFT, ACUTE (HCC): Primary | ICD-10-CM

## 2022-09-23 PROCEDURE — 3078F DIAST BP <80 MM HG: CPT | Performed by: OTHER

## 2022-09-23 PROCEDURE — 3074F SYST BP LT 130 MM HG: CPT | Performed by: OTHER

## 2022-09-23 PROCEDURE — 99214 OFFICE O/P EST MOD 30 MIN: CPT | Performed by: OTHER

## 2022-09-23 PROCEDURE — 3008F BODY MASS INDEX DOCD: CPT | Performed by: OTHER

## 2022-09-23 RX ORDER — CLOPIDOGREL BISULFATE 75 MG/1
75 TABLET ORAL DAILY
COMMUNITY
Start: 2022-09-21

## 2022-09-26 ENCOUNTER — TELEPHONE (OUTPATIENT)
Dept: NEUROLOGY | Facility: CLINIC | Age: 52
End: 2022-09-26

## 2022-09-26 ENCOUNTER — OFFICE VISIT (OUTPATIENT)
Dept: PHYSICAL THERAPY | Facility: HOSPITAL | Age: 52
End: 2022-09-26
Attending: Other

## 2022-09-26 PROCEDURE — 97112 NEUROMUSCULAR REEDUCATION: CPT

## 2022-09-26 NOTE — TELEPHONE ENCOUNTER
FMLA forms from [employer] filled out. Patient was admitted on 9/1 and per Dr. Carmelita Thornton okay to return to work on 10/3 with no restriction. Forms filled out accordingly. Mom states that forms need to be faxed by 9/27 (tomorrow). Faxed forms to OP. Will need Dr. Carmelita Thornton to sign before faxing to [facility] source at [phone].

## 2022-09-26 NOTE — PROGRESS NOTES
Diagnosis: imbalance, CVA  Visit (# authorized):  10 per POC Joana Deng)                         Referring Physician: Corazon Ritchie    Precautions: at risk of falls, [place]    Medication changes since last visit?:  No    Subjective: Plans to return to work a week from today. Objective:    Date  9/19 9/22 9/26        Visit Number  2 3 4        NMR x x x        Ther EX x          Ther Act           Gait Training           CRM            Manual             Additional Treatment Information:    NMR (41  mins):   Ambulation indoor, busier environment engaged in conversation, visually scanning (scavenger hunt) and navigation task -25 mins   Dual manual task    Ball on cup 1 lg lap   Water filled up ~120 ft (difficulty performing without spilling)  Weaving around cones (8)   2 bouts    4 bouts with dual manual task (ball on cup)   Side stepping over discs (8) x2 ea direction- SBA, CGA for balance checks   Dual motor task: sit<>stand with elbow flex<>ext (5#) x10- demo with return demo      Patient Education:  Encouraged continued performance of HEP. Assessment:   Challenged by dual manual tasking. Balance appears to be improving.       Plan: in future sessions: MMT LE, tap ups, backwards walking    Charges: 3 NMR       Total Timed Treatment: 41 min  Total Treatment Time: 41 min

## 2022-09-27 NOTE — TELEPHONE ENCOUNTER
Forms received in OP location. Signed & dated by Dr Josey Mueller on 9/27/22. Faxed to [facility] source @ [phone]. Confirmation received.      [facility] form sent to scanning in OP location

## 2022-09-28 ENCOUNTER — OFFICE VISIT (OUTPATIENT)
Dept: PHYSICAL THERAPY | Facility: HOSPITAL | Age: 52
End: 2022-09-28
Attending: Other

## 2022-09-28 PROCEDURE — 97112 NEUROMUSCULAR REEDUCATION: CPT

## 2022-09-28 NOTE — PROGRESS NOTES
Diagnosis: imbalance, CVA  Visit (# authorized):  10 per POC Dasha Rodríguez)                         Referring Physician: Lupe Mann    Precautions: at risk of falls, [place]    Medication changes since last visit?:  No    Subjective: No new information to report. Objective:    Date  9/19 9/22 9/26 9/28       Visit Number  2 3 4 5       NMR x x x x       Ther EX x          Ther Act           Gait Training           CRM            Manual             Additional Treatment Information:    NMR (42 mins):   Ambulation indoor, busier environment engaged in conversation, visually scanning (scavenger hunt) and navigation task -20 mins   Dual manual task    Water filled up ~120 ft (difficulty performing without spilling)   Ball on [employer]t 2 lg laps   Turns with visual scanning task alt directions (Checkerboard 1-30)   Ambulation backwards 30 ft -SBA VC's to \"Slow down\"   Ambulation fwd<>backwards with turns on command 30 ft x3 -SBA with CGA for balance checks with postural instability after turn    Patient Education:  Information for lateral wedge purchase provided. Assessment:   Challenged by dual manual tasking and turns during ambulation. Pt reports feeling \"Dizzy\" with turns during ambulation but upon further questioning sensation is more feeling of imbalance vs dizziness.      Plan: in future sessions: MMT LE, tap ups, continue backwards walking    Charges: 3 NMR       Total Timed Treatment: 42 min  Total Treatment Time: 42 min

## 2022-10-03 ENCOUNTER — APPOINTMENT (OUTPATIENT)
Dept: PHYSICAL THERAPY | Facility: HOSPITAL | Age: 52
End: 2022-10-03
Attending: Other
Payer: COMMERCIAL

## 2022-10-04 ENCOUNTER — OFFICE VISIT (OUTPATIENT)
Dept: PHYSICAL THERAPY | Facility: HOSPITAL | Age: 52
End: 2022-10-04
Attending: Other
Payer: COMMERCIAL

## 2022-10-04 PROCEDURE — 97112 NEUROMUSCULAR REEDUCATION: CPT

## 2022-10-04 NOTE — PROGRESS NOTES
Diagnosis: imbalance, CVA  Visit (# authorized):  10 per POC Zachary Barahona)                         Referring Physician: Josey Mueller    Precautions: at risk of falls, [place]    Medication changes since last visit?:  No    Subjective: No new information to report. Objective:    Date  9/19 9/22 9/26 9/28 10/4      Visit Number  2 3 4 5 6      NMR x x x x x      Ther EX x          Ther Act           Gait Training           CRM            Manual             Additional Treatment Information:    NMR (38 mins):   Ambulation outdoor and indoor, busier environment engaged in conversation, visually scanning. Including uneven surfaces and inclines/declines -25 mins (1 seated rest break during bout)  Ambulation stepping over raised obstacles (7) -SBA 6 bouts (1 LOB-[place] to recover)  Side stepping including over sticks (4) 2 bouts ea direction- supervision   Tap ups 8 in step x20 B- SBA-CGA     Assessment:   Path deviation with ambulation outdoors when engaged in conversation particularly. Challenged by stance stability on LLE.      Plan: in future sessions: MMT LE, continue tap ups, continue backwards walking    Charges: 3 NMR       Total Timed Treatment: 38 min  Total Treatment Time: 40 min

## 2022-10-05 ENCOUNTER — APPOINTMENT (OUTPATIENT)
Dept: PHYSICAL THERAPY | Facility: HOSPITAL | Age: 52
End: 2022-10-05
Attending: Other
Payer: COMMERCIAL

## 2022-10-10 ENCOUNTER — APPOINTMENT (OUTPATIENT)
Dept: PHYSICAL THERAPY | Facility: HOSPITAL | Age: 52
End: 2022-10-10
Attending: Other
Payer: COMMERCIAL

## 2022-10-11 ENCOUNTER — OFFICE VISIT (OUTPATIENT)
Dept: PHYSICAL THERAPY | Facility: HOSPITAL | Age: 52
End: 2022-10-11
Attending: Other
Payer: COMMERCIAL

## 2022-10-11 PROCEDURE — 97112 NEUROMUSCULAR REEDUCATION: CPT

## 2022-10-11 NOTE — PROGRESS NOTES
Diagnosis: imbalance, CVA  Visit (# authorized):  10 per POC Zachary Barahona)                         Referring Physician: Josey Mueller    Precautions: at risk of falls, [place]    Medication changes since last visit?:  No    Subjective: pt reports non-compliance to provided HEP. Objective:  Pt arrived late to session. Date  9/19 9/22 9/26 9/28 10/4 10/11     Visit Number  2 3 4 5 6 7     NMR x x x x x x     Ther EX x          Ther Act           Gait Training           CRM            Manual             Additional Treatment Information:    NMR (38 mins):   Ambulation with head turns- horizontal and vertical 60 ft x2 ea   VOR cxl with ambulation horizontal and vertical 60 ft x4 ea   Backwards walking 50 ft x4   Side stepping 40 ft x2 ea direction   Cross over stepping 40 ft x2 ea direction   4 steps EC, stop EO- repeat 50 ft x4   Rebounder 2# - fwd step with throw x15 B   Sit<>Stand feet on foam- no UE support, standing for ~5 seconds before returning to sitting x15  Standing on foam- pt selected SAM- upper trunk rotation x10 B- SBA     Patient education: Pt encouraged to increase compliance to HEP. Assessment:   Pt initially challenged activities standing on foam but improved stability with increased time/reps.      Plan: in future sessions: braiding, continue tap ups, continue EC walking, continue foam     Charges: 3 NMR       Total Timed Treatment: 38 min  Total Treatment Time: 38 min

## 2022-10-12 ENCOUNTER — APPOINTMENT (OUTPATIENT)
Dept: PHYSICAL THERAPY | Facility: HOSPITAL | Age: 52
End: 2022-10-12
Attending: Other
Payer: COMMERCIAL

## 2022-10-13 ENCOUNTER — OFFICE VISIT (OUTPATIENT)
Dept: PHYSICAL THERAPY | Facility: HOSPITAL | Age: 52
End: 2022-10-13
Attending: Other
Payer: COMMERCIAL

## 2022-10-13 PROCEDURE — 97112 NEUROMUSCULAR REEDUCATION: CPT

## 2022-10-13 NOTE — PROGRESS NOTES
Diagnosis: imbalance, CVA  Visit (# authorized):  10 per POC Gopalshawn Miller)                         Referring Physician: Bowen Hearn    Precautions: at risk of falls, [place]    Medication changes since last visit?:  No    Subjective: no new information to report. Objective:     Date  9/19 9/22 9/26 9/28 10/4 10/11 10/13    Visit Number  2 3 4 5 6 7 8    NMR x x x x x x x    Ther EX x          Ther Act           Gait Training           CRM            Manual             Additional Treatment Information:    NMR (38 mins): Step up 4 in step no UE support x15 B -SBA  Side step up 4 in step no UE support x15 B -SBA  VOR cxl with ambulation horizontal and vertical 60 ft x4 ea   Cross over stepping 40 ft ea direction   Braiding 40 ft ea direction- SBA with CGA for balance checks   4 steps EC, stop EO- repeat 50 ft x4 -->8 steps EC 50 ft x4 SBA  Sit<>Stand feet on foam- no UE support, standing for ~5 seconds before returning to sitting x15  Standing on foam- pt selected SAM- upper trunk rotation x10 B- SBA  Rebounder 2# -SBA   Pt selected SAM x20    Narrow SAM x20  Tap up cone x10 B -SBA    Assessment:   Progressing well. Intermittent seated rest breaks during session.      Plan: in future sessions: braiding, continue tap ups, continue EC walking, continue foam     Charges: 3 NMR       Total Timed Treatment: 40 min  Total Treatment Time: 42 min

## 2022-10-17 ENCOUNTER — APPOINTMENT (OUTPATIENT)
Dept: PHYSICAL THERAPY | Facility: HOSPITAL | Age: 52
End: 2022-10-17
Attending: Other
Payer: COMMERCIAL

## 2022-10-18 ENCOUNTER — OFFICE VISIT (OUTPATIENT)
Dept: PHYSICAL THERAPY | Facility: HOSPITAL | Age: 52
End: 2022-10-18
Attending: Other
Payer: COMMERCIAL

## 2022-10-18 PROCEDURE — 97112 NEUROMUSCULAR REEDUCATION: CPT

## 2022-10-18 NOTE — PROGRESS NOTES
Diagnosis: imbalance, CVA  Visit (# authorized):  10 per POC Viki Montaño)                         Referring Physician: Flavio Ivy    Precautions: at risk of falls, [place]    Medication changes since last visit?:  No    Subjective: no new information to report. Objective:     Date  9/19 9/22 9/26 9/28 10/4 10/11 10/13 10/18   Visit Number  2 3 4 5 6 7 8 9   NMR x x x x x x x x   Ther EX x          Ther Act           Gait Training           CRM            Manual             Additional Treatment Information:    NMR (40 mins):   Ambulation through indoor environment visually scanning for specific items as well as navigation task- 20 mins   Braiding- demo with return demo- 40 ft ea direction, cues for sequence SBA with CGA for balance checks  One foot on foam- opp foot to cone tap x15 B- SBA with CGA for balance checks   Step stance on foam 30 sec ea foot in front  Step stance on foam with body blade held out in front in BUE - dual task with self perturbations 30 sec ea foot in front   Rockerboard A/P taps-->centered hold for max time   Rockerboard R/L centered hold for max time      Assessment:   Continues to progress well.  Challenged by foam tasks today    Plan: in future sessions: continue tap ups, continue EC walking, continue foam, fwd step to foam from foam    Charges: 3 NMR       Total Timed Treatment: 40 min  Total Treatment Time: 42 min

## 2022-10-19 ENCOUNTER — APPOINTMENT (OUTPATIENT)
Dept: PHYSICAL THERAPY | Facility: HOSPITAL | Age: 52
End: 2022-10-19
Attending: Other
Payer: COMMERCIAL

## 2022-10-20 ENCOUNTER — OFFICE VISIT (OUTPATIENT)
Dept: PHYSICAL THERAPY | Facility: HOSPITAL | Age: 52
End: 2022-10-20
Attending: Other
Payer: COMMERCIAL

## 2022-10-20 PROCEDURE — 97112 NEUROMUSCULAR REEDUCATION: CPT

## 2022-10-20 NOTE — PROGRESS NOTES
Discharge Summary    Referring Physician: Josey Mueller    Diagnosis: imbalance, CVA     Pt has attended 10 visits in Physical Therapy. Subjective: Pt reports increased compliance to HEP. Feels as if he is getting stronger and balance has improved. Denies falls and LOB. Feels mildly off balance with quick turns otherwise feels \"ok\". Assessment: Pt demonstrates significant improvement in DGI and SSWS compared to evaluation. He is not considered an increased risk of falling based on DGI, FGA, or 4 square step test. He is able to navigate more complex environments with good postural stability and no AD despite intermittent path deviation especially with distraction/dual tasking. All goals have been met and he is to be discharged at this time. Objective:   *values from initial testing documented in parenthesis below    SLS EO: R 2 seconds (3 sec), L 7 seconds (3 sec)     Gait speed: 1.15 m/s (0.81 m/s no AD)          Dynamic Gait Index    1. Gait level surface __3___  2. Change in gait speed __3___  3. Gait with horizontal head turns __2___  4. Gait with vertical head turns __3___  5. Gait and pivot turn __3___  6. Step over obstacle __3__  7. Step around obstacles _3__  8. Steps __2___    Total  _22 (17)___/24  </= 19/24 = predictive of falls     Functional Gait Assessment   Item Description Score (0 worst, 3 best)    ___3___1. Gait Level Surface-  Time: ___5.3____seconds  ___3___2. Change in gait speed  ___2___3. Gait with horizontal head turns (mild path deviation)  ___3___4. Gait with vertical head turns  ___3___5. Gait and pivot turn  ___3___6. Step over obstacle  ___0___7. Gait with narrow base of support-  # of steps___2____  ___0___8. Gait with eyes closed-  Time: unable, path deviation  ___3___9. Ambulating backward  ___2___10.  Steps    TOTAL SCORE: ___22___/30    (less than 22/30 = fall risk)    4 square step test: 10.5 seconds (16.8 seconds- close supervision)       Goals:   Goals to be met within POC or 90 days:  1. Pt to be independent in HEP goal met   2. Pt will ambulate complex community environments indoors and outdoors without LOB independently without AD goal met  3. Pt will be able to perform functional head turns/visually scanning during ambulation without LOB. Goal met       Charges: 2 NMR     Total timed treatment: 30 mins  Total treatment time: 30 mins      Plan: DC PT    Patient was advised of these findings, precautions, and treatment options and has agreed to actively participate in planning and for this course of care. Thank you for your referral. If you have any questions, please contact me at Dept: [phone].     Sincerely,    Birdena Apgar PT, NCS    Electronically signed by therapist: Birdena Apgar, PT

## 2022-10-24 ENCOUNTER — APPOINTMENT (OUTPATIENT)
Dept: PHYSICAL THERAPY | Facility: HOSPITAL | Age: 52
End: 2022-10-24
Attending: Other
Payer: COMMERCIAL

## 2022-10-25 ENCOUNTER — APPOINTMENT (OUTPATIENT)
Dept: PHYSICAL THERAPY | Facility: HOSPITAL | Age: 52
End: 2022-10-25
Attending: Other
Payer: COMMERCIAL

## 2022-10-26 ENCOUNTER — APPOINTMENT (OUTPATIENT)
Dept: PHYSICAL THERAPY | Facility: HOSPITAL | Age: 52
End: 2022-10-26
Attending: Other
Payer: COMMERCIAL

## 2022-10-27 ENCOUNTER — APPOINTMENT (OUTPATIENT)
Dept: PHYSICAL THERAPY | Facility: HOSPITAL | Age: 52
End: 2022-10-27
Attending: Other
Payer: COMMERCIAL

## 2022-11-01 ENCOUNTER — APPOINTMENT (OUTPATIENT)
Dept: PHYSICAL THERAPY | Facility: HOSPITAL | Age: 52
End: 2022-11-01
Attending: Other
Payer: COMMERCIAL

## 2022-11-03 ENCOUNTER — APPOINTMENT (OUTPATIENT)
Dept: PHYSICAL THERAPY | Facility: HOSPITAL | Age: 52
End: 2022-11-03
Attending: Other
Payer: COMMERCIAL

## 2022-11-08 ENCOUNTER — APPOINTMENT (OUTPATIENT)
Dept: PHYSICAL THERAPY | Facility: HOSPITAL | Age: 52
End: 2022-11-08
Attending: Other
Payer: COMMERCIAL

## 2022-11-10 ENCOUNTER — APPOINTMENT (OUTPATIENT)
Dept: PHYSICAL THERAPY | Facility: HOSPITAL | Age: 52
End: 2022-11-10
Attending: Other
Payer: COMMERCIAL

## 2025-02-04 ENCOUNTER — OFFICE VISIT (OUTPATIENT)
Dept: FAMILY MEDICINE CLINIC | Facility: CLINIC | Age: 55
End: 2025-02-04
Payer: COMMERCIAL

## 2025-02-04 VITALS
HEART RATE: 102 BPM | BODY MASS INDEX: 31 KG/M2 | SYSTOLIC BLOOD PRESSURE: 136 MMHG | RESPIRATION RATE: 18 BRPM | WEIGHT: 190 LBS | DIASTOLIC BLOOD PRESSURE: 60 MMHG | TEMPERATURE: 100 F | OXYGEN SATURATION: 98 %

## 2025-02-04 DIAGNOSIS — J11.1 INFLUENZA-LIKE ILLNESS: Primary | ICD-10-CM

## 2025-02-04 LAB
OPERATOR ID: NORMAL
RAPID SARS-COV-2 BY PCR: NOT DETECTED

## 2025-02-04 PROCEDURE — U0002 COVID-19 LAB TEST NON-CDC: HCPCS | Performed by: NURSE PRACTITIONER

## 2025-02-04 PROCEDURE — 99202 OFFICE O/P NEW SF 15 MIN: CPT | Performed by: NURSE PRACTITIONER

## 2025-02-04 RX ORDER — OSELTAMIVIR PHOSPHATE 75 MG/1
75 CAPSULE ORAL 2 TIMES DAILY
Qty: 10 CAPSULE | Refills: 0 | Status: SHIPPED | OUTPATIENT
Start: 2025-02-04 | End: 2025-02-09

## 2025-02-04 RX ORDER — DILTIAZEM HYDROCHLORIDE 360 MG/1
360 CAPSULE, EXTENDED RELEASE ORAL DAILY
COMMUNITY
Start: 2024-09-25

## 2025-02-04 RX ORDER — LOSARTAN POTASSIUM 50 MG/1
100 TABLET ORAL DAILY
COMMUNITY
Start: 2024-12-09

## 2025-02-04 RX ORDER — ATORVASTATIN CALCIUM 80 MG/1
1 TABLET, FILM COATED ORAL DAILY
COMMUNITY
Start: 2025-01-31

## 2025-02-04 NOTE — PATIENT INSTRUCTIONS
Start Tamiflu twice daily with food, may make you nauseated/vomit-so eat something when taking!  Rest as able  Drink at least 4 oz of water every hour during the day, minimum water intake of 80 oz  May take Tylenol as needed for pain/fever  Follow up with primary care if symptoms persist longer than one week. Go directly to the emergency room if symptoms worsen (unable to drink fluids as above, dizziness worsens, any other concerning symptoms)

## 2025-02-04 NOTE — PROGRESS NOTES
HPI:   Garfield Gomez is a 55 year old male who presents with ill symptoms for  3  days. Patient reports extreme fatigue that began three days ago, loss of appetite, nasal congestion, low grade fever. Has tried nothing for relief relief. Unsure of contacts sick. Mother is with him and states he has slept for multiple hours, came home from work today and went to bed, not drinking well.    Current Outpatient Medications   Medication Sig Dispense Refill    atorvastatin 80 MG Oral Tab Take 1 tablet (80 mg total) by mouth daily.      dilTIAZem HCl ER Coated Beads 360 MG Oral Capsule SR 24 Hr Take 1 capsule (360 mg total) by mouth daily.      losartan 50 MG Oral Tab Take 2 tablets (100 mg total) by mouth daily.      clopidogrel 75 MG Oral Tab Take 1 tablet (75 mg total) by mouth daily.      aspirin 81 MG Oral Chew Tab Chew 1 tablet (81 mg total) by mouth at bedtime.       No current facility-administered medications for this visit.      Past Medical History:    Brain tumor (benign) (HCC)    Double vision    Loss of hearing    Stroke (HCC)      Past Surgical History:   Procedure Laterality Date    Brain surgery      Eye surgery      Replace/revise brain shunt      2016      No family history on file.   Social History     Socioeconomic History    Marital status: Single   Tobacco Use    Smoking status: Never    Smokeless tobacco: Never   Vaping Use    Vaping status: Never Used   Substance and Sexual Activity    Alcohol use: Never    Drug use: Never     Social Drivers of Health     Food Insecurity: No Food Insecurity (11/25/2024)    Received from [facility]    Hunger Vital Sign     Worried About Running Out of Food in the Last Year: Never true     Ran Out of Food in the Last Year: Never true   Transportation Needs: No Transportation Needs (11/25/2024)    Received from [facility]    PRAPARE - Transportation     Lack of Transportation (Medical): No     Lack of Transportation  (Non-Medical): No   Housing Stability: Unknown (11/25/2024)    Received from [facility]    Housing Stability Vital Sign     Unable to Pay for Housing in the Last Year: No         REVIEW OF SYSTEMS:   GENERAL: feels well otherwise, fatigued as above  HEENT: congested, as above in HPI  LUNGS: denies shortness of breath with exertion  CARDIOVASCULAR: denies chest pain on exertion  GI: no nausea or abdominal pain, appetite down, as above  NEURO: admits to headaches    EXAM:   /60   Pulse 102   Temp 99.6 °F (37.6 °C) (Oral)   Resp 18   Wt 190 lb (86.2 kg)   SpO2 98%   BMI 30.67 kg/m²   GENERAL: well developed, well nourished,in no apparent distress, appears unwell but non toxic appearing, feels warmer than temperature  HEENT: atraumatic, normocephalic,ears clear, nares with rhinorrhea, throat pink. [place] midline.  No sinus tenderness with palpation.  NECK: supple,anterior and posterior cervical adenopathy  LUNGS: clear to auscultation  CARDIO: murmur appreciated  Results for orders placed or performed in visit on 02/04/25   COVID-19 LAB TEST NON-CDC    Collection Time: 02/04/25  4:30 PM    Specimen: Nares   Result Value Ref Range    Rapid SARS-CoV-2 by PCR Not Detected Not Detected    POCT Lot Number [id number]     POCT Expiration Date 8/2/26     POCT Procedure Control Control Valid Control Valid     ,149          ASSESSMENT AND PLAN:    PLAN:Garfield was seen today for nasal congestion.    Diagnoses and all orders for this visit:    Influenza-like illness  -     COVID-19 LAB TEST NON-CDC  -     oseltamivir 75 MG Oral Cap; Take 1 capsule (75 mg total) by mouth 2 (two) times daily for 5 days.      Negative rapid Covid. Will start Tamiflu, patient will take home test for Flu A and start medication as below. Warning signs for need to proceed to higher level of care discussed with patient/mother. Note for return to work with improvement provided. Self care discussed. Medication use  and risk/benefit discussed. Patient is advised to follow up with PCP if not improving with treatment plan or seek immediate care if symptoms worsen.  The patient indicates understanding of these issues and agrees to the plan.  Patient Instructions   Start Tamiflu twice daily with food, may make you nauseated/vomit-so eat something when taking!  Rest as able  Drink at least 4 oz of water every hour during the day, minimum water intake of 80 oz  May take Tylenol as needed for pain/fever  Follow up with primary care if symptoms persist longer than one week. Go directly to the emergency room if symptoms worsen (unable to drink fluids as above, dizziness worsens, any other concerning symptoms)

## 2025-07-23 ENCOUNTER — APPOINTMENT (OUTPATIENT)
Dept: GENERAL RADIOLOGY | Facility: HOSPITAL | Age: 55
End: 2025-07-23
Attending: EMERGENCY MEDICINE

## 2025-07-23 ENCOUNTER — APPOINTMENT (OUTPATIENT)
Dept: CT IMAGING | Facility: HOSPITAL | Age: 55
End: 2025-07-23

## 2025-07-23 ENCOUNTER — HOSPITAL ENCOUNTER (EMERGENCY)
Facility: HOSPITAL | Age: 55
Discharge: HOME | End: 2025-07-24
Attending: EMERGENCY MEDICINE

## 2025-07-23 DIAGNOSIS — T07.XXXA MULTIPLE CONTUSIONS: ICD-10-CM

## 2025-07-23 DIAGNOSIS — S09.90XA INJURY OF HEAD, INITIAL ENCOUNTER: ICD-10-CM
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
## 2025-07-23 PROCEDURE — 99284 EMERGENCY DEPT VISIT MOD MDM: CPT

## 2025-07-23 PROCEDURE — 70450 CT HEAD/BRAIN W/O DYE: CPT | Performed by: EMERGENCY MEDICINE

## 2025-07-23 PROCEDURE — 99285 EMERGENCY DEPT VISIT HI MDM: CPT

## 2025-07-23 PROCEDURE — 72125 CT NECK SPINE W/O DYE: CPT | Performed by: EMERGENCY MEDICINE

## 2025-07-23 RX ORDER — CYCLOBENZAPRINE HCL 10 MG
10 TABLET ORAL ONCE
Status: COMPLETED | OUTPATIENT
Start: 2025-07-23 | End: 2025-07-24

## 2025-07-23 RX ORDER — ACETAMINOPHEN 500 MG
1000 TABLET ORAL ONCE
Status: COMPLETED | OUTPATIENT
Start: 2025-07-23 | End: 2025-07-24

## 2025-07-24 ENCOUNTER — APPOINTMENT (OUTPATIENT)
Dept: GENERAL RADIOLOGY | Facility: HOSPITAL | Age: 55
End: 2025-07-24
Attending: EMERGENCY MEDICINE

## 2025-07-24 VITALS
DIASTOLIC BLOOD PRESSURE: 61 MMHG | SYSTOLIC BLOOD PRESSURE: 130 MMHG | OXYGEN SATURATION: 92 % | TEMPERATURE: 98 F | WEIGHT: 184 LBS | BODY MASS INDEX: 30 KG/M2 | HEART RATE: 88 BPM | RESPIRATION RATE: 16 BRPM

## 2025-07-24 PROCEDURE — 73130 X-RAY EXAM OF HAND: CPT | Performed by: EMERGENCY MEDICINE

## 2025-07-24 PROCEDURE — 73502 X-RAY EXAM HIP UNI 2-3 VIEWS: CPT | Performed by: EMERGENCY MEDICINE

## 2025-07-24 PROCEDURE — 71045 X-RAY EXAM CHEST 1 VIEW: CPT | Performed by: EMERGENCY MEDICINE

## 2025-07-24 PROCEDURE — 72100 X-RAY EXAM L-S SPINE 2/3 VWS: CPT | Performed by: EMERGENCY MEDICINE

## 2025-07-24 RX ORDER — ACETAMINOPHEN 325 MG/1
650 TABLET ORAL EVERY 6 HOURS PRN
Qty: 20 TABLET | Refills: 0 | Status: SHIPPED | OUTPATIENT
Start: 2025-07-24

## 2025-07-24 RX ORDER — CYCLOBENZAPRINE HCL 10 MG
10 TABLET ORAL 3 TIMES DAILY PRN
Qty: 20 TABLET | Refills: 0 | Status: SHIPPED | OUTPATIENT
Start: 2025-07-24 | End: 2025-07-31

## 2025-07-24 NOTE — ED QUICK NOTES
Called xray - stated xrays cannot be done while patient is in c-collar - c-spine needs to be cleared before xrays can be completed.

## 2025-07-24 NOTE — DISCHARGE INSTRUCTIONS
Take all medication as prescribed.  Follow-up with his primary care provider in 1 to 2 days.  Return to the ER if some continue, get worse, unable to follow-up

## 2025-07-24 NOTE — ED QUICK NOTES
C- spine cleared by MD Beard @ bedside - xray called - en route to bedside to take patient to scans @ this time.

## 2025-07-24 NOTE — ED QUICK NOTES
Assumed care of patient @ this time - patient arrived to ED with mom - dillon graff fall @ 2000 today PT - patient confirms he was standing on a platform and opened a door at the top of the stairs then fell backwards down 13 carpeted stairs - patient confirms hitting head - denies LOC - takes a daily aspirin - multiple abrasions notable to patient scalp - bleeding controlled - patient denies headache, neck pain - confirms lower back pain, right thumb pain, and right hip pain.    Patient has a shunt in his brain from a brain tumor when he was younger + hx of 4 strokes per mom - patient wears hearing aids.     Patient breathing non-labored on RA + pt hemodynamically stable.

## 2025-07-24 NOTE — ED INITIAL ASSESSMENT (HPI)
Pt arrives ambulatory to ED for c/o fall around 2000pm tonight. Pt states he was standing on the landing and opened a door, falling backwards down 13 carpeted stairs. Pt hit his head. Denies LOC. Pt has multiple abrasion to scalp. +lower back pain. +ASA daily. Pt has a shunt from a brain tumor when he was younger. Aox4, speaking in full sentences.     Level II trauma called 2230

## 2025-07-24 NOTE — ED PROVIDER NOTES
Patient Seen in: [facility] Emergency Department    History     Chief Complaint   Patient presents with    Trauma       HPI    55-year-old male with a history of a previous stroke,  shunt, who comes in with mother after he had a mechanical fall.  Patient tripped and fell  backwards down 13 stairs.  No loss conscious.  Complain of a headache and neck pain also some lower back pain right hand and right hip pain.  No chest pain or shortness of breath.  No leg pain.  No abdominal pain    History reviewed. Past Medical History[1]    History reviewed. Past Surgical History[2]      Medications :  Prescriptions Prior to Admission[3]     Family History[4]    Smoking Status: Social Hx on file[5]    Constitutional and vital signs reviewed.      Social History and Family History elements reviewed from today, pertinent positives to the presenting problem noted.    Physical Exam     ED Triage Vitals [07/23/25 2232]   /74   Pulse 97   Resp 16   Temp 97.9 °F (36.6 °C)   Temp src Temporal   SpO2 92 %   O2 Device None (Room air)       All measures to prevent infection transmission during my interaction with the patient were taken. Handwashing was performed prior to and after the exam.  Stethoscope and any equipment used during my examination was cleaned with super sani-cloth germicidal wipes following the exam.     Physical Exam  Vitals and nursing note reviewed.   HENT:      Head: Normocephalic.   Eyes:      Pupils: Pupils are equal, round, and reactive to light.   Cardiovascular:      Rate and Rhythm: Normal rate.      Pulses: Normal pulses.   Pulmonary:      Effort: Pulmonary effort is normal.   Abdominal:      Palpations: Abdomen is soft.   Musculoskeletal:      Comments: Bilateral lower lateral paraspinal lumbar pain with palpation.   Skin:     General: Skin is warm and dry.      Comments: Abrasions to the scalp along with right hand.   Neurological:      Mental Status: He is alert.         ED Course      Labs  Reviewed - No data to display    As Interpreted by me    Imaging Results Available and Reviewed while in ED: No results found.  Preliminary Radiology Report  [facility], [facility]  [phone] - Phone    [place] [facility]    NAME: SHABBIR MARTINEZ    DATE OF EXAM: 07/23/2025  Patient No:  [id number]  Physician:  RAMÓN^JETHRO^^^  YOB: 1970    Past Medical History (entered by Technologist):    Reason For Exam (entered by Technologist):  Multiple abrasions to scalp, vomiting post fall. Blood thinners+  Other Notes (entered by Technologist): POD 2: [phone]    Additional Information (per [facility]): Multiple abrasions to scalp, vomiting post fall, on anticoagulation    CT BRAIN (without IV contrast)  CT CERVICAL SPINE    Comparison: 9/1/22 head CT    IMPRESSION    HEAD:    No identifiable soft tissue swelling.  No intracranial hemorrhage or acute fracture.    Mild to moderate periventricular, deep and subcortical white matter hypodensities, suspect for the residua from chronic small vessel ischemia.    Old appearing lacunar infarct left basal ganglia right posterior internal capsule adjacent to right ventricular trigone and right alyce.    Scattered small hypodensities involving the cerebellum, potentially lacunar infarcts and/or postsurgical encephalomalacia.      Left posterior inferior cerebellar encephalomalacia from prior surgery with left posterior inferior occipital craniotomy and possible posterior C1 arch partial resection.    Right posterior parietal approach ventriculostomy tube with its tip in the right lateral ventricle, unchanged.    Partial opacifications/mom large mucous retention cyst involving the left maxillary sinus.  The mastoids are normal.   The orbits are unremarkable.      CERVICAL:    The vertebral bodies are well aligned without evidence for fracture.   No prevertebral soft tissue swelling or surrounding soft tissue edema.    Multilevel cervical  spondylosis.  Posterior C1 Arch nonunion, likely postsurgical given the adjacent surgical clips and posterior inferior occipital craniotomy.    2.9 cm pretracheal cyst in the midline supraclavicular region.                   Marc Gosselin, M.D.  This report has been electronically signed and verified by the Radiologist whose name is printed above.       This report contains privileged and confidential information and is intended solely for the use of the individual or entity to which it is addressed. If you are not the intended recipient of this report, you are hereby notified that any copying, distribution, dissemination or action taken in relation to the contents of this report is strictly prohibited and may be unlawful. If you have received this report in error, please notify the sender immediately at [phone] and permanently delete the original report and destroy any copies or printouts.  Preliminary Radiology Report  [facility], [facility]  [phone] - Phone    [facility]    NAME: SHABBIR MARTINEZ    DATE OF EXAM: 07/24/2025  Patient No:  [id number]  Physician:  RAMÓN^JETHRO^^^  YOB: 1970    Past Medical History (entered by Technologist):    Reason For Exam (entered by Technologist):  Accidental fall today.  Other Notes (entered by Technologist): ED ROOM 25, POD 2    Additional Information (per [facility]): Accidental fall today    AP CHEST RADIOGRAPH at 12:43 AM    Comparison: None    IMPRESSION    The cardiomediastinal silhouette is unremarkable.     Low lung volumes with mild vascular crowding/atelectasis in both lower lobes/infrahilar regions  No consolidation, pleural effusion or pneumothorax.     Osseous structures do not demonstrate any acute displaced fractures.  Ventricular peritoneal shunt tubing overlying the right chest wall with associated previous orphaned partially calcified residual shunt tubing.    The results were faxed at 2:38 AM central standard time.  If you would like to discuss this case directly please call [phone] ([email or web address] [phone]).       Marc Gosselin, M.D.  This report has been electronically signed and verified by the Radiologist whose name is printed above.       This report contains privileged and confidential information and is intended solely for the use of the individual or entity to which it is addressed. If you are not the intended recipient of this report, you are hereby notified that any copying, distribution, dissemination or action taken in relation to the contents of this report is strictly prohibited and may be unlawful. If you have received this report in error, please notify the sender immediately at [phone] and permanently delete the original report and destroy any copies or printouts.  Preliminary Radiology Report  [facility]  [phone] - Phone    [facility]    NAME: SHABBIR MARTINEZ    DATE OF EXAM: 07/24/2025  Patient No:  [id number]  Physician:    YOB: 1970    Past Medical History (entered by Technologist):    Reason For Exam (entered by Technologist):  Low back pain post fall.  Other Notes (entered by Technologist): ED ROOM 25, POD 2    Additional Information (per [facility]): Low back pain post fall    3 VIEWS LUMBAR SPINE RADIOGRAPHS at 12:51 AM    Comparison: None    IMPRESSION    Generalized demineralization.  The vertebral bodies are well aligned without identifiable acute fracture.  Visualized sacrum is not demonstrated any definite acute injury.  Visualized posterior ribs appear intact.    Ventricular peritoneal shunt tubing extends into the upper mid pelvis.    The results were faxed at 2:41 AM central standard time. If you would like to discuss this case directly please call [phone] ([phone] [phone]).       Marc Gosselin, M.D.  This report has been electronically signed and verified by the Radiologist whose name is printed above.       This report contains privileged and  confidential information and is intended solely for the use of the individual or entity to which it is addressed. If you are not the intended recipient of this report, you are hereby notified that any copying, distribution, dissemination or action taken in relation to the contents of this report is strictly prohibited and may be unlawful. If you have received this report in error, please notify the sender immediately at [phone] and permanently delete the original report and destroy any copies or printouts.  Preliminary Radiology Report  [facility]  [phone] - Phone    [facility]    NAME: SHABBIR MARTINEZ    DATE OF EXAM: 07/24/2025  Patient No:  [id number]  Physician:    YOB: 1970    Past Medical History (entered by Technologist):    Reason For Exam (entered by Technologist):  RIght hip pain post fall.  Other Notes (entered by Technologist): ED ROOM 25, POD 2    Additional Information (per [facility]): Right hip pain after fall    AP PELVIS RADIOGRAPH at 12:54 AM  AP AND LATERAL RIGHT HIP RADIOGRAPHS at 12:55 AM    Comparison: None    IMPRESSION:    PELVIS: The femoral heads are well aligned with the acetabulum.  No identifiable pelvic or acute sacral fracture.  Lower lumbar spine appears unremarkable.    Ventricular peritoneal shunt tubing is coiled within the upper mid pelvis.      RIGHT HIP: Right femoral head is aligned with the acetabulum.  No identifiable femoral head or neck fracture.  Suspected associated soft tissue swelling along the lateral aspect of the right femoral neck    Visualized right pelvis appears unremarkable.    The results were faxed at 2:43 AM central standard time. If you would like to discuss this case directly please call [phone] (extension [phone]).         Marc Gosselin, M.D.  This report has been electronically signed and verified by the Radiologist whose name is printed above.       This report contains privileged and confidential  information and is intended solely for the use of the individual or entity to which it is addressed. If you are not the intended recipient of this report, you are hereby notified that any copying, distribution, dissemination or action taken in relation to the contents of this report is strictly prohibited and may be unlawful. If you have received this report in error, please notify the sender immediately at [phone] and permanently delete the original report and destroy any copies or printouts.  Preliminary Radiology Report  [facility], [facility]  [phone] - Phone    [place] [facility]    NAME: SHABBIR MARTINEZ    DATE OF EXAM: 07/24/2025  Patient No:  [id number]  Physician:  RAMÓN^JETHRO^^^  YOB: 1970    Past Medical History (entered by Technologist):    Reason For Exam (entered by Technologist):  Right 1st digit pain post fall.  Other Notes (entered by Technologist): ED ROOM 25, POD 2    Additional Information (per [facility]): Right first digit pain post fall    3 VIEWS RIGHT HAND RADIOGRAPHS at 12:47 AM    Comparison: None    IMPRESSION    The bones appear well aligned without acute fracture.  No significant soft tissue swelling seen.    The results were faxed at 2:45 AM central standard time. If you would like to discuss this case directly please call [phone] ([phone]).       Marc Gosselin, M.D.  This report has been electronically signed and verified by the Radiologist whose name is printed above.       This report contains privileged and confidential information and is intended solely for the use of the individual or entity to which it is addressed. If you are not the intended recipient of this report, you are hereby notified that any copying, distribution, dissemination or action taken in relation to the contents of this report is strictly prohibited and may be unlawful. If you have received this report in error, please notify the sender immediately at [phone]  and permanently delete the original report and destroy any copies or printouts.    ED Medications Administered:   Medications   acetaminophen (Tylenol Extra Strength) tab 1,000 mg (1,000 mg Oral Given 7/24/25 0003)   cyclobenzaprine (Flexeril) tab 10 mg (10 mg Oral Given 7/24/25 0003)         MDM     Vitals:    07/24/25 0045 07/24/25 0126 07/24/25 0130 07/24/25 0330   BP: 141/67 123/53  130/61   Pulse: 93 87  88   Resp: 14 14  16   Temp:       TempSrc:       SpO2: 93% (!) 89% 93% 92%   Weight:         *I personally reviewed and interpreted all ED vitals.    Pulse Ox: 92%, Room air, Normal       Differential Diagnosis/ Diagnostic Considerations: ICH, fracture, dislocation, contusion, pneumothorax    Complicating Factors: The patient already has does not have any pertinent problems on file. to contribute to the complexity of this ED evaluation.    Medical Decision Making  Amount and/or Complexity of Data Reviewed  Independent Historian: parent     Details: Mother at the bedside helping contribute to patient's medical history  External Data Reviewed: notes.     Details: I reviewed notes from patient's primary care provider  to help contribute to patient's medical history  Radiology: ordered and independent interpretation performed. Decision-making details documented in ED Course.    Risk  OTC drugs.  Prescription drug management.    CT brain and C-spine read by the [facility] show nothing acute.  C-collar was able to be removed after that and now will get x-rays.  I reviewed the chest x-ray myself there is no pneumothorax.  Chest x-ray, lumbar spine, hip, hand showed nothing acute.  No fractures or dislocations.  I explained the patient his mother at the bedside all just musculoskeletal pain.  Will discharge home with Tylenol and Flexeril.  Patient should take all these medication as prescribed.  Continue taking any home medication.  Follow-up with his primary care provider 1 to 2 days.  Return to the ER if some  continue, you are worse, unable to follow-up    Condition upon leaving the department: Stable    Disposition and Plan     Clinical Impression:  1. Fall, initial encounter    2. Injury of head, initial encounter    3. Multiple contusions        Disposition:  Discharge    Follow-up:  Myron Mansfield MD  [address] [facility] [address]  [place] [place] [address]  [phone]    Follow up        Medications Prescribed:  Discharge Medication List as of 7/24/2025  3:21 AM        START taking these medications    Details   acetaminophen 325 MG Oral Tab Take 2 tablets (650 mg total) by mouth every 6 (six) hours as needed for Pain., Normal, Disp-20 tablet, R-0      cyclobenzaprine 10 MG Oral Tab Take 1 tablet (10 mg total) by mouth 3 (three) times daily as needed for Muscle spasms., Normal, Disp-20 tablet, R-0                              [1]   Past Medical History:   Brain tumor (benign) (HCC)    Double vision    Loss of hearing    Stroke (HCC)   [2]   Past Surgical History:  Procedure Laterality Date    Brain surgery      Eye surgery      Replace/revise brain shunt      2016   [3] (Not in a hospital admission)   [4] No family history on file.  [5]   Social History  Socioeconomic History    Marital status: Single   Tobacco Use    Smoking status: Never    Smokeless tobacco: Never   Vaping Use    Vaping status: Never Used   Substance and Sexual Activity    Alcohol use: Never    Drug use: Never

## (undated) NOTE — LETTER
Date: 2/4/2025    Patient Name: Garfield Gomez          To Whom it may concern:    he above patient was seen at [facility] for treatment of a medical condition.    This patient should be excused from attending work until fever free for 24 hours with no medication use and respiratory symptoms are mostly improved per patient report.    The patient is expected to return to work on or around 2/7/25 with no limitations.        Sincerely,        KIRSTEN Simpson

## (undated) NOTE — LETTER
22          Alivia Addison  :  1970      To Whom It May Concern: This patient was seen in our office on 22 . Work status: May return to work full-time, no restrictions    May return to work status per above effective 10/3/2022. If this office may be of further assistance, please do not hesitate to contact us.       Sincerely,          Marianne Yeboah, DO